# Patient Record
Sex: FEMALE | Race: WHITE | URBAN - METROPOLITAN AREA
[De-identification: names, ages, dates, MRNs, and addresses within clinical notes are randomized per-mention and may not be internally consistent; named-entity substitution may affect disease eponyms.]

---

## 2018-04-30 NOTE — PATIENT DISCUSSION
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  RETURN FOR FOLLOW-UP AS SCHEDULED FOR DILATED FUNDUS EXAM.

## 2018-04-30 NOTE — PATIENT DISCUSSION
CATARACT, OS - VISUALLY SIGNIFICANT. SCHEDULE PHACO WITH IOL IF VISUAL SYMPTOMS PERSIST. GLASSES RX GIVEN TO FILL IF DESIRES IN THE EVENT PATIENT DOES NOT PROCEED WITH SURGERY.

## 2018-04-30 NOTE — PATIENT DISCUSSION
NO FAMILY HISTORY, GOOD IOP. PACHY AND BASELINE DISC PHOTOS TODAY. CONSIDER VF AFTER CE OS FOR INCREASED ACCURACY.

## 2018-08-27 NOTE — PATIENT DISCUSSION
NO FAMILY HISTORY, GOOD IOP. VF DISCUSSED WITH PATIENT. DOES NOT CORRELATE WITH OCT. LOW THRESHOLD FOR 51 [address].

## 2020-07-25 ENCOUNTER — TELEPHONE ENCOUNTER (OUTPATIENT)
Dept: URBAN - METROPOLITAN AREA CLINIC 13 | Facility: CLINIC | Age: 50
End: 2020-07-25

## 2020-07-25 RX ORDER — AMITRIPTYLINE HYDROCHLORIDE 10 MG/1
TAKE 1 TABLET DAILY AT BEDTIME TABLET, FILM COATED ORAL
Qty: 30 | Refills: 6 | OUTPATIENT
Start: 2015-01-05 | End: 2019-08-16

## 2020-07-25 RX ORDER — LANSOPRAZOLE 30 MG/1
TAKE 1 CAPSULE DAILY CAPSULE, DELAYED RELEASE ORAL
Qty: 30 | Refills: 4 | OUTPATIENT
End: 2019-08-16

## 2020-07-25 RX ORDER — LINACLOTIDE 290 UG/1
TAKE 1 CAPSULE DAILY CAPSULE, GELATIN COATED ORAL
Qty: 30 | Refills: 5 | OUTPATIENT
Start: 2014-12-22 | End: 2019-08-16

## 2020-07-26 ENCOUNTER — TELEPHONE ENCOUNTER (OUTPATIENT)
Dept: URBAN - METROPOLITAN AREA CLINIC 13 | Facility: CLINIC | Age: 50
End: 2020-07-26

## 2020-07-26 RX ORDER — ALBUTEROL SULFATE 90 UG/1
INL 2 PFS PO Q 4 H PRN AEROSOL, METERED RESPIRATORY (INHALATION)
Qty: 7 | Refills: 0 | Status: ACTIVE | COMMUNITY
Start: 2019-06-27

## 2020-07-26 RX ORDER — CIPROFLOXACIN HYDROCHLORIDE 500 MG/1
TABLET, FILM COATED ORAL
Qty: 10 | Refills: 0 | Status: ACTIVE | COMMUNITY
Start: 2020-03-02

## 2020-07-26 RX ORDER — SODIUM SULFATE, POTASSIUM SULFATE, MAGNESIUM SULFATE 17.5; 3.13; 1.6 G/ML; G/ML; G/ML
TAKE HALF OF PREP AT 5 PM THE EVENING BEFORE THE PROCEDURE. TAKE SECOND HALF OF PREP 6 HOURS BEFORE THE PROCEDURE SOLUTION, CONCENTRATE ORAL
Qty: 1 | Refills: 0 | Status: ACTIVE | COMMUNITY
Start: 2019-08-16

## 2020-07-26 RX ORDER — SERTRALINE 100 MG/1
TABLET, FILM COATED ORAL
Qty: 30 | Refills: 0 | Status: ACTIVE | COMMUNITY
Start: 2019-03-23

## 2020-07-26 RX ORDER — LEVOTHYROXINE SODIUM 25 UG/1
TABLET ORAL
Qty: 30 | Refills: 0 | Status: ACTIVE | COMMUNITY
Start: 2020-05-07

## 2020-07-26 RX ORDER — ESTRADIOL 2 MG/1
TAKE 1 TABLET DAILY TABLET ORAL
Refills: 0 | Status: ACTIVE | COMMUNITY
Start: 2019-08-16

## 2020-07-26 RX ORDER — METHYLPREDNISOLONE 4 MG/1
TABLET ORAL
Qty: 21 | Refills: 0 | Status: ACTIVE | COMMUNITY
Start: 2020-01-21

## 2020-07-26 RX ORDER — PHENTERMINE HYDROCHLORIDE 30 MG/1
TAKE 1 TABLET BY MOUTH EVERY DAY TABLET ORAL
Qty: 30 | Refills: 0 | Status: ACTIVE | COMMUNITY
Start: 2020-01-15

## 2020-07-26 RX ORDER — CODEINE PHOSPHATE AND GUAIFENESIN 10; 100 MG/5ML; MG/5ML
LIQUID ORAL
Qty: 120 | Refills: 0 | Status: ACTIVE | COMMUNITY
Start: 2020-01-21

## 2020-07-26 RX ORDER — OLOPATADINE HYDROCHLORIDE 1 MG/ML
INT 1 GTT AEY BID AT AN INTERVAL OF 6 TO 8 H SOLUTION/ DROPS OPHTHALMIC
Qty: 5 | Refills: 0 | Status: ACTIVE | COMMUNITY
Start: 2019-06-27

## 2020-07-26 RX ORDER — METHYLPREDNISOLONE 4 MG/1
TABLET ORAL
Qty: 21 | Refills: 0 | Status: ACTIVE | COMMUNITY
Start: 2019-01-11

## 2020-07-26 RX ORDER — SERTRALINE 100 MG/1
TABLET, FILM COATED ORAL
Qty: 90 | Refills: 0 | Status: ACTIVE | COMMUNITY
Start: 2020-04-06

## 2020-07-26 RX ORDER — SERTRALINE 100 MG/1
TABLET, FILM COATED ORAL
Qty: 30 | Refills: 0 | Status: ACTIVE | COMMUNITY
Start: 2019-12-23

## 2020-07-26 RX ORDER — PROMETHAZINE HYDROCHLORIDE AND CODEINE PHOSPHATE 6.25; 1 MG/5ML; MG/5ML
SOLUTION ORAL
Qty: 180 | Refills: 0 | Status: ACTIVE | COMMUNITY
Start: 2019-01-11

## 2020-07-26 RX ORDER — FLUCONAZOLE 150 MG/1
TABLET ORAL
Qty: 1 | Refills: 0 | Status: ACTIVE | COMMUNITY
Start: 2020-03-02

## 2020-07-26 RX ORDER — ALBUTEROL SULFATE 90 UG/1
AEROSOL, METERED RESPIRATORY (INHALATION)
Qty: 7 | Refills: 0 | Status: ACTIVE | COMMUNITY
Start: 2019-07-10

## 2020-07-26 RX ORDER — AMOXICILLIN 875 MG/1
TABLET, FILM COATED ORAL
Qty: 20 | Refills: 0 | Status: ACTIVE | COMMUNITY
Start: 2019-06-21

## 2020-07-26 RX ORDER — CODEINE PHOSPHATE AND GUAIFENESIN 10; 100 MG/5ML; MG/5ML
LIQUID ORAL
Qty: 180 | Refills: 0 | Status: ACTIVE | COMMUNITY
Start: 2019-05-16

## 2020-07-26 RX ORDER — NITROFURANTOIN MONOHYDRATE/MACROCRYSTALLINE 25; 75 MG/1; MG/1
TAKE 1 CAPSULE BY MOUTH EVERY 12 HOURS CAPSULE ORAL
Qty: 10 | Refills: 0 | Status: ACTIVE | COMMUNITY
Start: 2018-10-10

## 2020-07-26 RX ORDER — FEXOFENADINE HCL 180 MG/1
TK 1 T PO QD TABLET ORAL
Qty: 30 | Refills: 0 | Status: ACTIVE | COMMUNITY
Start: 2019-06-27

## 2020-07-26 RX ORDER — FLUTICASONE PROPIONATE 50 UG/1
SHAKE LQ AND U 1 SPR IEN BID SPRAY, METERED NASAL
Qty: 16 | Refills: 0 | Status: ACTIVE | COMMUNITY
Start: 2019-06-27

## 2020-08-11 ENCOUNTER — CLAIMS CREATED FROM THE CLAIM WINDOW (OUTPATIENT)
Dept: URBAN - METROPOLITAN AREA CLINIC 4 | Facility: CLINIC | Age: 50
End: 2020-08-11
Payer: COMMERCIAL

## 2020-08-11 ENCOUNTER — OFFICE VISIT (OUTPATIENT)
Dept: URBAN - METROPOLITAN AREA SURGERY CENTER 25 | Facility: SURGERY CENTER | Age: 50
End: 2020-08-11
Payer: COMMERCIAL

## 2020-08-11 DIAGNOSIS — D12.4 ADENOMATOUS POLYP OF DESCENDING COLON: ICD-10-CM

## 2020-08-11 DIAGNOSIS — D12.0 BENIGN NEOPLASM OF CECUM: ICD-10-CM

## 2020-08-11 DIAGNOSIS — D12.4 BENIGN NEOPLASM OF DESCENDING COLON: ICD-10-CM

## 2020-08-11 DIAGNOSIS — Z12.11 COLON CANCER SCREENING: ICD-10-CM

## 2020-08-11 PROCEDURE — 45381 COLONOSCOPY SUBMUCOUS NJX: CPT | Performed by: INTERNAL MEDICINE

## 2020-08-11 PROCEDURE — 88305 TISSUE EXAM BY PATHOLOGIST: CPT | Performed by: PATHOLOGY

## 2020-08-11 PROCEDURE — 45385 COLONOSCOPY W/LESION REMOVAL: CPT | Performed by: INTERNAL MEDICINE

## 2020-08-25 ENCOUNTER — OFFICE VISIT (OUTPATIENT)
Dept: URBAN - METROPOLITAN AREA CLINIC 113 | Facility: CLINIC | Age: 50
End: 2020-08-25
Payer: COMMERCIAL

## 2020-08-25 ENCOUNTER — DASHBOARD ENCOUNTERS (OUTPATIENT)
Age: 50
End: 2020-08-25

## 2020-08-25 VITALS
HEART RATE: 88 BPM | HEIGHT: 64 IN | BODY MASS INDEX: 31.58 KG/M2 | SYSTOLIC BLOOD PRESSURE: 112 MMHG | WEIGHT: 185 LBS | TEMPERATURE: 97.7 F | DIASTOLIC BLOOD PRESSURE: 78 MMHG

## 2020-08-25 DIAGNOSIS — D12.6 TUBULOVILLOUS ADENOMA OF COLON: ICD-10-CM

## 2020-08-25 DIAGNOSIS — R19.8 IRREGULAR BOWEL HABITS: ICD-10-CM

## 2020-08-25 PROBLEM — 428054006: Status: ACTIVE | Noted: 2020-08-25

## 2020-08-25 PROBLEM — 444702007: Status: ACTIVE | Noted: 2020-08-25

## 2020-08-25 PROCEDURE — 3017F COLORECTAL CA SCREEN DOC REV: CPT | Performed by: INTERNAL MEDICINE

## 2020-08-25 PROCEDURE — 99213 OFFICE O/P EST LOW 20 MIN: CPT | Performed by: INTERNAL MEDICINE

## 2020-08-25 PROCEDURE — G8427 DOCREV CUR MEDS BY ELIG CLIN: HCPCS | Performed by: INTERNAL MEDICINE

## 2020-08-25 RX ORDER — METHYLPREDNISOLONE 4 MG/1
TABLET ORAL
Qty: 21 | Refills: 0 | Status: ON HOLD | COMMUNITY
Start: 2019-01-11

## 2020-08-25 RX ORDER — CIPROFLOXACIN HYDROCHLORIDE 500 MG/1
TABLET, FILM COATED ORAL
Qty: 10 | Refills: 0 | Status: ON HOLD | COMMUNITY
Start: 2020-03-02

## 2020-08-25 RX ORDER — PROMETHAZINE HYDROCHLORIDE AND CODEINE PHOSPHATE 6.25; 1 MG/5ML; MG/5ML
SOLUTION ORAL
Qty: 180 | Refills: 0 | Status: ON HOLD | COMMUNITY
Start: 2019-01-11

## 2020-08-25 RX ORDER — SERTRALINE HYDROCHLORIDE 50 MG/1
1 TABLET TABLET, FILM COATED ORAL ONCE A DAY
Status: ACTIVE | COMMUNITY

## 2020-08-25 RX ORDER — FLUCONAZOLE 150 MG/1
TABLET ORAL
Qty: 1 | Refills: 0 | Status: ON HOLD | COMMUNITY
Start: 2020-03-02

## 2020-08-25 RX ORDER — NITROFURANTOIN MONOHYDRATE/MACROCRYSTALLINE 25; 75 MG/1; MG/1
TAKE 1 CAPSULE BY MOUTH EVERY 12 HOURS CAPSULE ORAL
Qty: 10 | Refills: 0 | Status: ON HOLD | COMMUNITY
Start: 2018-10-10

## 2020-08-25 RX ORDER — SERTRALINE 100 MG/1
TABLET, FILM COATED ORAL
Qty: 30 | Refills: 0 | Status: ON HOLD | COMMUNITY
Start: 2019-03-23

## 2020-08-25 RX ORDER — SERTRALINE HYDROCHLORIDE 100 MG/1
1 TABLET TABLET ORAL ONCE A DAY
Status: ACTIVE | COMMUNITY

## 2020-08-25 RX ORDER — FEXOFENADINE HCL 180 MG/1
TK 1 T PO QD TABLET ORAL
Qty: 30 | Refills: 0 | Status: ON HOLD | COMMUNITY
Start: 2019-06-27

## 2020-08-25 RX ORDER — ALBUTEROL SULFATE 90 UG/1
INL 2 PFS PO Q 4 H PRN AEROSOL, METERED RESPIRATORY (INHALATION)
Qty: 7 | Refills: 0 | Status: ON HOLD | COMMUNITY
Start: 2019-06-27

## 2020-08-25 RX ORDER — AMOXICILLIN 875 MG/1
TABLET, FILM COATED ORAL
Qty: 20 | Refills: 0 | Status: ON HOLD | COMMUNITY
Start: 2019-06-21

## 2020-08-25 RX ORDER — PHENTERMINE HYDROCHLORIDE 30 MG/1
TAKE 1 TABLET BY MOUTH EVERY DAY TABLET ORAL
Qty: 30 | Refills: 0 | Status: ON HOLD | COMMUNITY
Start: 2020-01-15

## 2020-08-25 RX ORDER — FLUTICASONE PROPIONATE 50 UG/1
SHAKE LQ AND U 1 SPR IEN BID SPRAY, METERED NASAL
Qty: 16 | Refills: 0 | Status: ON HOLD | COMMUNITY
Start: 2019-06-27

## 2020-08-25 RX ORDER — OLOPATADINE HYDROCHLORIDE 1 MG/ML
INT 1 GTT AEY BID AT AN INTERVAL OF 6 TO 8 H SOLUTION/ DROPS OPHTHALMIC
Qty: 5 | Refills: 0 | Status: ON HOLD | COMMUNITY
Start: 2019-06-27

## 2020-08-25 RX ORDER — ESTRADIOL 2 MG/1
TAKE 1 TABLET DAILY TABLET ORAL
Refills: 0 | Status: ACTIVE | COMMUNITY
Start: 2019-08-16

## 2020-08-25 RX ORDER — LEVOTHYROXINE SODIUM 50 UG/1
1 CAPSULE TABLET ORAL ONCE A DAY
Refills: 0 | Status: ACTIVE | COMMUNITY
Start: 2020-05-07

## 2020-08-25 RX ORDER — CODEINE PHOSPHATE AND GUAIFENESIN 10; 100 MG/5ML; MG/5ML
LIQUID ORAL
Qty: 180 | Refills: 0 | Status: ON HOLD | COMMUNITY
Start: 2019-05-16

## 2020-08-25 RX ORDER — SODIUM SULFATE, POTASSIUM SULFATE, MAGNESIUM SULFATE 17.5; 3.13; 1.6 G/ML; G/ML; G/ML
TAKE HALF OF PREP AT 5 PM THE EVENING BEFORE THE PROCEDURE. TAKE SECOND HALF OF PREP 6 HOURS BEFORE THE PROCEDURE SOLUTION, CONCENTRATE ORAL
Qty: 1 | Refills: 0 | Status: ON HOLD | COMMUNITY
Start: 2019-08-16

## 2020-08-25 NOTE — HPI-TODAY'S VISIT:
This is a 50-year-old female with a history of hypothyroidism presenting for follow-up after a screening colonoscopy.    Colonoscopy 8/11/2020: BBPS 6, removal of a 4 mm semi-sessile cecal tubular adenoma, removal of a 6 mm semi-sessile descending tubulovillous adenoma, and piecemeal/incomplete removal of a 40 to 50 mm descending tubulovillous adenoma status post tattoo.  Repeat colonoscopy for completion of polypectomy recommended in November.  She has a history of irregular bowel habits reporting constipation for 3 days followed by an episode of diarrhea and the cycle will repeat itself.  She has been taking align daily without significant improvement.  She has inadequately tried fiber in the past.  She denies abdominal pain or any other abdominal symptoms.

## 2020-08-31 ENCOUNTER — OFFICE VISIT (OUTPATIENT)
Dept: URBAN - METROPOLITAN AREA SURGERY CENTER 25 | Facility: SURGERY CENTER | Age: 50
End: 2020-08-31

## 2020-09-14 ENCOUNTER — OFFICE VISIT (OUTPATIENT)
Dept: URBAN - METROPOLITAN AREA CLINIC 113 | Facility: CLINIC | Age: 50
End: 2020-09-14

## 2020-09-28 ENCOUNTER — WEB ENCOUNTER (OUTPATIENT)
Dept: URBAN - METROPOLITAN AREA CLINIC 113 | Facility: CLINIC | Age: 50
End: 2020-09-28

## 2022-02-25 ENCOUNTER — NEW PATIENT (OUTPATIENT)
Dept: URBAN - METROPOLITAN AREA CLINIC 35 | Facility: CLINIC | Age: 52
End: 2022-02-25

## 2022-02-25 DIAGNOSIS — H52.203: ICD-10-CM

## 2022-02-25 DIAGNOSIS — H52.4: ICD-10-CM

## 2022-02-25 DIAGNOSIS — H52.03: ICD-10-CM

## 2022-02-25 PROCEDURE — 92004 COMPRE OPH EXAM NEW PT 1/>: CPT

## 2022-02-25 PROCEDURE — 92015 DETERMINE REFRACTIVE STATE: CPT

## 2022-02-25 PROCEDURE — 92310-3 LEVEL 3 CONTACT LENS MANAGEMENT

## 2022-02-25 ASSESSMENT — VISUAL ACUITY
OS_SC: 20/30-2
OS_SC: J8
OU_SC: 20/20
OU_SC: J7
OD_SC: J8
OD_SC: 20/20

## 2022-02-25 ASSESSMENT — TONOMETRY
OD_IOP_MMHG: 14
OS_IOP_MMHG: 15

## 2022-03-14 ENCOUNTER — CONTACT LENSES/GLASSES VISIT (OUTPATIENT)
Dept: URBAN - METROPOLITAN AREA CLINIC 35 | Facility: CLINIC | Age: 52
End: 2022-03-14

## 2022-03-14 DIAGNOSIS — H52.4: ICD-10-CM

## 2022-03-14 DIAGNOSIS — H52.203: ICD-10-CM

## 2022-03-14 DIAGNOSIS — H52.03: ICD-10-CM

## 2022-03-14 PROCEDURE — 92310-3 LEVEL 3 CONTACT LENS MANAGEMENT

## 2022-03-14 ASSESSMENT — VISUAL ACUITY
OD_CC: 20/25
OD_CC: J8
OS_PH: 20/30+2
OS_CC: 20/80
OU_CC: 20/25-1
OS_CC: J4
OU_CC: J3

## 2022-10-20 ENCOUNTER — CONSULTATION/EVALUATION (OUTPATIENT)
Dept: URBAN - METROPOLITAN AREA CLINIC 39 | Facility: CLINIC | Age: 52
End: 2022-10-20

## 2022-10-20 DIAGNOSIS — H52.4: ICD-10-CM

## 2022-10-20 DIAGNOSIS — H52.03: ICD-10-CM

## 2022-10-20 DIAGNOSIS — H04.123: ICD-10-CM

## 2022-10-20 DIAGNOSIS — H52.7: ICD-10-CM

## 2022-10-20 DIAGNOSIS — H52.203: ICD-10-CM

## 2022-10-20 PROCEDURE — 92136TC INTERFEROMETRY - TECHNICAL COMPONENT

## 2022-10-20 PROCEDURE — 92025 CPTRIZED CORNEAL TOPOGRAPHY: CPT

## 2022-10-20 PROCEDURE — 68761C PUNCTUM PLUG /COLLAGEN, EACH

## 2022-10-20 PROCEDURE — A4262 TEMPORARY TEAR DUCT PLUG: HCPCS

## 2022-10-20 PROCEDURE — 92134 CPTRZ OPH DX IMG PST SGM RTA: CPT

## 2022-10-20 PROCEDURE — 99499LK REFRACTIVE CONSULT/LASIK

## 2022-10-20 RX ORDER — CYCLOSPORINE 0 MG/ML: 1 SOLUTION/ DROPS OPHTHALMIC; TOPICAL

## 2022-10-20 ASSESSMENT — VISUAL ACUITY
OD_SC: J12
OS_BAT: 20/25
OS_SC: J12
OD_CC: J1
OD_BAT: 20/25
OD_SC: 20/20
OS_SC: 20/20
OS_CC: J1

## 2022-10-20 ASSESSMENT — TONOMETRY
OD_IOP_MMHG: 12
OS_IOP_MMHG: 12

## 2022-10-20 NOTE — PROCEDURE NOTE: CLINICAL
PROCEDURE NOTE: Punctal Plugs, Dissolvable 0.4mm #1 All 4 Lids. Diagnosis: Dry Eye Syndrome. Anesthesia: Proparacaine 0.5%. Prior to treatment, the risks/benefits/alternatives were discussed. The patient wished to proceed with procedure. Temporary collagen plugs were inserted. Patient tolerated procedure well. There were no complications. Post procedure instructions given. Danielle Maya

## 2022-10-20 NOTE — PATIENT DISCUSSION
The patient has undergone maximal medical therapy with artificial tears and is still with signs and symptoms of ocular surface disease / dry eye syndrome. After risks, benefits, and alternatives were discussed, the patient would like to proceed with punctual plugging of bilateral lower and upper puncti. 0.4mm x 10/20/22.

## 2022-10-20 NOTE — PATIENT DISCUSSION
10/20/22 JOD: Pt currently happy with +1.75 OS CL. Shoot for -1.50 goal OS with MAUREEN. If patient elects [facility], repeat SO's following dry eye treatment. Begin warm compresses and Cequa qid x1 month, then bid OU. Punctal plugs placed today (0.4mm BUL & BLL). Patient leaning towards MAUREEN OS, goal of -1.50.